# Patient Record
Sex: FEMALE | Race: WHITE | NOT HISPANIC OR LATINO | Employment: OTHER | URBAN - METROPOLITAN AREA
[De-identification: names, ages, dates, MRNs, and addresses within clinical notes are randomized per-mention and may not be internally consistent; named-entity substitution may affect disease eponyms.]

---

## 2020-01-01 ENCOUNTER — DOCUMENTATION ONLY (OUTPATIENT)
Dept: INFUSION THERAPY | Facility: HOSPITAL | Age: 50
End: 2020-01-01

## 2020-01-01 ENCOUNTER — TELEPHONE (OUTPATIENT)
Dept: HEMATOLOGY/ONCOLOGY | Facility: CLINIC | Age: 50
End: 2020-01-01

## 2020-01-01 ENCOUNTER — DOCUMENTATION ONLY (OUTPATIENT)
Dept: HEMATOLOGY/ONCOLOGY | Facility: CLINIC | Age: 50
End: 2020-01-01

## 2020-01-01 ENCOUNTER — DOCUMENTATION ONLY (OUTPATIENT)
Dept: PHARMACY | Facility: AMBULARY SURGERY CENTER | Age: 50
End: 2020-01-01

## 2020-01-01 ENCOUNTER — TELEPHONE (OUTPATIENT)
Dept: NEUROSURGERY | Facility: CLINIC | Age: 50
End: 2020-01-01

## 2020-01-01 ENCOUNTER — CLINICAL SUPPORT (OUTPATIENT)
Dept: HEMATOLOGY/ONCOLOGY | Facility: CLINIC | Age: 50
End: 2020-01-01
Payer: MEDICARE

## 2020-01-01 ENCOUNTER — LAB VISIT (OUTPATIENT)
Dept: FAMILY MEDICINE | Facility: CLINIC | Age: 50
End: 2020-01-01
Payer: MEDICARE

## 2020-01-01 ENCOUNTER — INFUSION (OUTPATIENT)
Dept: INFUSION THERAPY | Facility: HOSPITAL | Age: 50
End: 2020-01-01
Attending: INTERNAL MEDICINE
Payer: MEDICARE

## 2020-01-01 ENCOUNTER — LAB VISIT (OUTPATIENT)
Dept: LAB | Facility: HOSPITAL | Age: 50
End: 2020-01-01
Attending: INTERNAL MEDICINE
Payer: MEDICARE

## 2020-01-01 ENCOUNTER — OFFICE VISIT (OUTPATIENT)
Dept: HEMATOLOGY/ONCOLOGY | Facility: CLINIC | Age: 50
End: 2020-01-01
Payer: MEDICARE

## 2020-01-01 ENCOUNTER — HOSPITAL ENCOUNTER (OUTPATIENT)
Dept: RADIOLOGY | Facility: HOSPITAL | Age: 50
Discharge: HOME | End: 2020-07-22
Attending: NEUROLOGICAL SURGERY
Payer: MEDICARE

## 2020-01-01 ENCOUNTER — CLINICAL SUPPORT (OUTPATIENT)
Dept: NEUROSURGERY | Facility: CLINIC | Age: 50
End: 2020-01-01
Payer: MEDICARE

## 2020-01-01 VITALS
RESPIRATION RATE: 14 BRPM | DIASTOLIC BLOOD PRESSURE: 76 MMHG | TEMPERATURE: 99 F | BODY MASS INDEX: 21.43 KG/M2 | HEIGHT: 65 IN | SYSTOLIC BLOOD PRESSURE: 145 MMHG | OXYGEN SATURATION: 94 % | WEIGHT: 128.63 LBS | HEART RATE: 102 BPM

## 2020-01-01 VITALS
TEMPERATURE: 98 F | OXYGEN SATURATION: 91 % | RESPIRATION RATE: 20 BRPM | HEART RATE: 112 BPM | BODY MASS INDEX: 22.06 KG/M2 | SYSTOLIC BLOOD PRESSURE: 126 MMHG | WEIGHT: 128.5 LBS | DIASTOLIC BLOOD PRESSURE: 74 MMHG

## 2020-01-01 VITALS
OXYGEN SATURATION: 96 % | DIASTOLIC BLOOD PRESSURE: 59 MMHG | BODY MASS INDEX: 21.91 KG/M2 | RESPIRATION RATE: 11 BRPM | HEIGHT: 64 IN | WEIGHT: 128.31 LBS | SYSTOLIC BLOOD PRESSURE: 124 MMHG | TEMPERATURE: 98 F | HEART RATE: 104 BPM

## 2020-01-01 VITALS
HEIGHT: 64 IN | SYSTOLIC BLOOD PRESSURE: 124 MMHG | HEART RATE: 108 BPM | RESPIRATION RATE: 20 BRPM | BODY MASS INDEX: 21.94 KG/M2 | DIASTOLIC BLOOD PRESSURE: 58 MMHG | WEIGHT: 128.5 LBS | OXYGEN SATURATION: 94 % | TEMPERATURE: 98 F

## 2020-01-01 VITALS
RESPIRATION RATE: 18 BRPM | HEIGHT: 64 IN | DIASTOLIC BLOOD PRESSURE: 66 MMHG | BODY MASS INDEX: 21.94 KG/M2 | SYSTOLIC BLOOD PRESSURE: 119 MMHG | HEART RATE: 84 BPM | TEMPERATURE: 99 F | WEIGHT: 128.5 LBS

## 2020-01-01 DIAGNOSIS — C79.31 BRAIN METASTASES: ICD-10-CM

## 2020-01-01 DIAGNOSIS — C79.51 BONE METASTASES: ICD-10-CM

## 2020-01-01 DIAGNOSIS — T40.2X5A CONSTIPATION DUE TO OPIOID THERAPY: ICD-10-CM

## 2020-01-01 DIAGNOSIS — C34.90 PRIMARY MALIGNANT NEOPLASM OF LUNG METASTATIC TO OTHER SITE, UNSPECIFIED LATERALITY: Primary | ICD-10-CM

## 2020-01-01 DIAGNOSIS — G89.21 CHRONIC PAIN DUE TO TRAUMA: ICD-10-CM

## 2020-01-01 DIAGNOSIS — G89.3 PAIN, NEOPLASM-RELATED: ICD-10-CM

## 2020-01-01 DIAGNOSIS — C78.00 SECONDARY CARCINOMA OF LUNG, UNSPECIFIED LATERALITY: Primary | ICD-10-CM

## 2020-01-01 DIAGNOSIS — Z03.818 ENCNTR FOR OBS FOR SUSP EXPSR TO OTH BIOLG AGENTS RULED OUT: Primary | ICD-10-CM

## 2020-01-01 DIAGNOSIS — F41.1 GENERALIZED ANXIETY DISORDER: ICD-10-CM

## 2020-01-01 DIAGNOSIS — C79.31 METASTATIC SMALL CELL CARCINOMA TO BRAIN: Primary | ICD-10-CM

## 2020-01-01 DIAGNOSIS — K59.03 CONSTIPATION DUE TO OPIOID THERAPY: ICD-10-CM

## 2020-01-01 DIAGNOSIS — I82.403 DEEP VEIN THROMBOSIS (DVT) OF BOTH LOWER EXTREMITIES, UNSPECIFIED CHRONICITY, UNSPECIFIED VEIN: Primary | ICD-10-CM

## 2020-01-01 DIAGNOSIS — C78.00 SECONDARY CARCINOMA OF LUNG, UNSPECIFIED LATERALITY: ICD-10-CM

## 2020-01-01 DIAGNOSIS — Z03.818 ENCNTR FOR OBS FOR SUSP EXPSR TO OTH BIOLG AGENTS RULED OUT: ICD-10-CM

## 2020-01-01 DIAGNOSIS — T82.9XXA VASCULAR PORT COMPLICATION, INITIAL ENCOUNTER: Primary | ICD-10-CM

## 2020-01-01 DIAGNOSIS — C34.90 PRIMARY MALIGNANT NEOPLASM OF LUNG METASTATIC TO OTHER SITE, UNSPECIFIED LATERALITY: ICD-10-CM

## 2020-01-01 DIAGNOSIS — S72.001D CLOSED FRACTURE OF RIGHT HIP WITH ROUTINE HEALING, SUBSEQUENT ENCOUNTER: ICD-10-CM

## 2020-01-01 DIAGNOSIS — C79.31 METASTATIC SMALL CELL CARCINOMA TO BRAIN: ICD-10-CM

## 2020-01-01 DIAGNOSIS — I62.9 INTRACRANIAL HEMORRHAGE: ICD-10-CM

## 2020-01-01 DIAGNOSIS — C34.90 NON-SMALL CELL LUNG CANCER WITH METASTASIS: ICD-10-CM

## 2020-01-01 DIAGNOSIS — R60.0 LOCALIZED EDEMA: ICD-10-CM

## 2020-01-01 LAB
ALBUMIN SERPL BCP-MCNC: 3.5 G/DL (ref 3.5–5.2)
ALP SERPL-CCNC: 101 U/L (ref 38–145)
ALT SERPL W/O P-5'-P-CCNC: 21 U/L (ref 0–35)
ANION GAP SERPL CALC-SCNC: 7 MMOL/L (ref 8–16)
AST SERPL-CCNC: 23 U/L (ref 14–36)
BASOPHILS # BLD AUTO: 0.02 K/UL (ref 0–0.2)
BASOPHILS NFR BLD: 0.2 % (ref 0–1.9)
BILIRUB SERPL-MCNC: 0.3 MG/DL (ref 0.2–1.3)
BUN SERPL-MCNC: 12 MG/DL (ref 7–18)
CALCIUM SERPL-MCNC: 9.4 MG/DL (ref 8.4–10.2)
CHLORIDE SERPL-SCNC: 96 MMOL/L (ref 95–110)
CO2 SERPL-SCNC: 30 MMOL/L (ref 22–31)
CREAT SERPL-MCNC: 0.38 MG/DL (ref 0.5–1.4)
DIFFERENTIAL METHOD: ABNORMAL
EOSINOPHIL # BLD AUTO: 0 K/UL (ref 0–0.5)
EOSINOPHIL NFR BLD: 0.2 % (ref 0–8)
ERYTHROCYTE [DISTWIDTH] IN BLOOD BY AUTOMATED COUNT: 14.4 % (ref 11.5–14.5)
EST. GFR  (AFRICAN AMERICAN): >60 ML/MIN/1.73 M^2
EST. GFR  (NON AFRICAN AMERICAN): >60 ML/MIN/1.73 M^2
GLUCOSE SERPL-MCNC: 235 MG/DL (ref 70–110)
HCT VFR BLD AUTO: 38.5 % (ref 37–48.5)
HGB BLD-MCNC: 12.3 G/DL (ref 12–16)
IMM GRANULOCYTES # BLD AUTO: 0.08 K/UL (ref 0–0.04)
IMM GRANULOCYTES NFR BLD AUTO: 0.6 % (ref 0–0.5)
LYMPHOCYTES # BLD AUTO: 0.6 K/UL (ref 1–4.8)
LYMPHOCYTES NFR BLD: 5 % (ref 18–48)
MAGNESIUM SERPL-MCNC: 1.7 MG/DL (ref 1.6–2.6)
MCH RBC QN AUTO: 30.1 PG (ref 27–31)
MCHC RBC AUTO-ENTMCNC: 31.9 G/DL (ref 32–36)
MCV RBC AUTO: 94 FL (ref 82–98)
MONOCYTES # BLD AUTO: 0.4 K/UL (ref 0.3–1)
MONOCYTES NFR BLD: 2.9 % (ref 4–15)
NEUTROPHILS # BLD AUTO: 11.7 K/UL (ref 1.8–7.7)
NEUTROPHILS NFR BLD: 91.1 % (ref 38–73)
NRBC BLD-RTO: 0 /100 WBC
PLATELET # BLD AUTO: 79 K/UL (ref 150–350)
PMV BLD AUTO: 9.9 FL (ref 9.2–12.9)
POTASSIUM SERPL-SCNC: 3.9 MMOL/L (ref 3.5–5.1)
PROT SERPL-MCNC: 6.1 G/DL (ref 6–8.4)
RBC # BLD AUTO: 4.09 M/UL (ref 4–5.4)
SARS-COV-2 RNA RESP QL NAA+PROBE: NOT DETECTED
SODIUM SERPL-SCNC: 133 MMOL/L (ref 136–145)
WBC # BLD AUTO: 12.84 K/UL (ref 3.9–12.7)

## 2020-01-01 PROCEDURE — 99214 PR OFFICE/OUTPT VISIT, EST, LEVL IV, 30-39 MIN: ICD-10-PCS | Mod: S$PBB,,, | Performed by: NURSE PRACTITIONER

## 2020-01-01 PROCEDURE — 96413 CHEMO IV INFUSION 1 HR: CPT | Mod: PN

## 2020-01-01 PROCEDURE — 63600175 PHARM REV CODE 636 W HCPCS: Mod: PN | Performed by: INTERNAL MEDICINE

## 2020-01-01 PROCEDURE — 99999 PR PBB SHADOW E&M-EST. PATIENT-LVL IV: CPT | Mod: PBBFAC,,, | Performed by: NURSE PRACTITIONER

## 2020-01-01 PROCEDURE — 99214 OFFICE O/P EST MOD 30 MIN: CPT | Mod: PBBFAC,PN | Performed by: NURSE PRACTITIONER

## 2020-01-01 PROCEDURE — 99999 PR PBB SHADOW E&M-EST. PATIENT-LVL IV: ICD-10-PCS | Mod: PBBFAC,,, | Performed by: NURSE PRACTITIONER

## 2020-01-01 PROCEDURE — 99213 OFFICE O/P EST LOW 20 MIN: CPT | Mod: PBBFAC,25,PN | Performed by: INTERNAL MEDICINE

## 2020-01-01 PROCEDURE — 99215 OFFICE O/P EST HI 40 MIN: CPT | Mod: S$PBB,,, | Performed by: INTERNAL MEDICINE

## 2020-01-01 PROCEDURE — 80053 COMPREHEN METABOLIC PANEL: CPT | Mod: PN

## 2020-01-01 PROCEDURE — 96415 CHEMO IV INFUSION ADDL HR: CPT | Mod: PN

## 2020-01-01 PROCEDURE — 85025 COMPLETE CBC W/AUTO DIFF WBC: CPT | Mod: PN

## 2020-01-01 PROCEDURE — 99999 PR PBB SHADOW E&M-EST. PATIENT-LVL IV: ICD-10-PCS | Mod: PBBFAC,,, | Performed by: INTERNAL MEDICINE

## 2020-01-01 PROCEDURE — 96367 TX/PROPH/DG ADDL SEQ IV INF: CPT | Mod: PN

## 2020-01-01 PROCEDURE — 99214 OFFICE O/P EST MOD 30 MIN: CPT | Mod: PBBFAC,PN | Performed by: INTERNAL MEDICINE

## 2020-01-01 PROCEDURE — 83735 ASSAY OF MAGNESIUM: CPT

## 2020-01-01 PROCEDURE — 80053 COMPREHEN METABOLIC PANEL: CPT

## 2020-01-01 PROCEDURE — 99999 PR PBB SHADOW E&M-EST. PATIENT-LVL III: CPT | Mod: PBBFAC,,, | Performed by: INTERNAL MEDICINE

## 2020-01-01 PROCEDURE — 85025 COMPLETE CBC W/AUTO DIFF WBC: CPT

## 2020-01-01 PROCEDURE — 70450 CT HEAD/BRAIN W/O DYE: CPT | Mod: 26,,, | Performed by: RADIOLOGY

## 2020-01-01 PROCEDURE — 25000003 PHARM REV CODE 250: Mod: PN | Performed by: INTERNAL MEDICINE

## 2020-01-01 PROCEDURE — 99214 OFFICE O/P EST MOD 30 MIN: CPT | Mod: S$PBB,,, | Performed by: NURSE PRACTITIONER

## 2020-01-01 PROCEDURE — 96417 CHEMO IV INFUS EACH ADDL SEQ: CPT | Mod: PN

## 2020-01-01 PROCEDURE — 99215 PR OFFICE/OUTPT VISIT, EST, LEVL V, 40-54 MIN: ICD-10-PCS | Mod: S$PBB,,, | Performed by: INTERNAL MEDICINE

## 2020-01-01 PROCEDURE — 70450 CT HEAD/BRAIN W/O DYE: CPT | Mod: TC,PO

## 2020-01-01 PROCEDURE — A4216 STERILE WATER/SALINE, 10 ML: HCPCS | Mod: PN | Performed by: INTERNAL MEDICINE

## 2020-01-01 PROCEDURE — 36593 DECLOT VASCULAR DEVICE: CPT | Mod: PN

## 2020-01-01 PROCEDURE — 83735 ASSAY OF MAGNESIUM: CPT | Mod: PN

## 2020-01-01 PROCEDURE — U0003 INFECTIOUS AGENT DETECTION BY NUCLEIC ACID (DNA OR RNA); SEVERE ACUTE RESPIRATORY SYNDROME CORONAVIRUS 2 (SARS-COV-2) (CORONAVIRUS DISEASE [COVID-19]), AMPLIFIED PROBE TECHNIQUE, MAKING USE OF HIGH THROUGHPUT TECHNOLOGIES AS DESCRIBED BY CMS-2020-01-R: HCPCS

## 2020-01-01 PROCEDURE — 70450 CT HEAD WITHOUT CONTRAST: ICD-10-PCS | Mod: 26,,, | Performed by: RADIOLOGY

## 2020-01-01 PROCEDURE — 99999 PR PBB SHADOW E&M-EST. PATIENT-LVL IV: CPT | Mod: PBBFAC,,, | Performed by: INTERNAL MEDICINE

## 2020-01-01 PROCEDURE — 36415 COLL VENOUS BLD VENIPUNCTURE: CPT | Mod: PN

## 2020-01-01 PROCEDURE — 63600175 PHARM REV CODE 636 W HCPCS: Mod: JG,PN | Performed by: INTERNAL MEDICINE

## 2020-01-01 PROCEDURE — 96375 TX/PRO/DX INJ NEW DRUG ADDON: CPT | Mod: PN

## 2020-01-01 PROCEDURE — 99999 PR PBB SHADOW E&M-EST. PATIENT-LVL III: ICD-10-PCS | Mod: PBBFAC,,, | Performed by: INTERNAL MEDICINE

## 2020-01-01 PROCEDURE — S0028 INJECTION, FAMOTIDINE, 20 MG: HCPCS | Mod: PN | Performed by: INTERNAL MEDICINE

## 2020-01-01 RX ORDER — SODIUM CHLORIDE 0.9 % (FLUSH) 0.9 %
10 SYRINGE (ML) INJECTION
Status: DISCONTINUED | OUTPATIENT
Start: 2020-01-01 | End: 2020-01-01 | Stop reason: HOSPADM

## 2020-01-01 RX ORDER — ONDANSETRON 8 MG/1
8 TABLET, ORALLY DISINTEGRATING ORAL EVERY 12 HOURS PRN
Qty: 30 TABLET | Refills: 1 | Status: ON HOLD | OUTPATIENT
Start: 2020-01-01 | End: 2020-01-01 | Stop reason: HOSPADM

## 2020-01-01 RX ORDER — HEPARIN 100 UNIT/ML
500 SYRINGE INTRAVENOUS
Status: CANCELLED | OUTPATIENT
Start: 2020-01-01

## 2020-01-01 RX ORDER — DIPHENHYDRAMINE HYDROCHLORIDE 50 MG/ML
50 INJECTION INTRAMUSCULAR; INTRAVENOUS ONCE AS NEEDED
Status: DISCONTINUED | OUTPATIENT
Start: 2020-01-01 | End: 2020-01-01 | Stop reason: HOSPADM

## 2020-01-01 RX ORDER — PROCHLORPERAZINE MALEATE 10 MG
10 TABLET ORAL EVERY 6 HOURS PRN
Qty: 30 TABLET | Refills: 1 | Status: ON HOLD | OUTPATIENT
Start: 2020-01-01 | End: 2020-01-01 | Stop reason: HOSPADM

## 2020-01-01 RX ORDER — FAMOTIDINE 10 MG/ML
20 INJECTION INTRAVENOUS
Status: CANCELLED | OUTPATIENT
Start: 2020-01-01

## 2020-01-01 RX ORDER — EPINEPHRINE 0.3 MG/.3ML
0.3 INJECTION SUBCUTANEOUS ONCE AS NEEDED
Status: CANCELLED | OUTPATIENT
Start: 2020-01-01

## 2020-01-01 RX ORDER — FAMOTIDINE 10 MG/ML
20 INJECTION INTRAVENOUS
Status: COMPLETED | OUTPATIENT
Start: 2020-01-01 | End: 2020-01-01

## 2020-01-01 RX ORDER — DIPHENHYDRAMINE HYDROCHLORIDE 50 MG/ML
50 INJECTION INTRAMUSCULAR; INTRAVENOUS ONCE AS NEEDED
Status: CANCELLED | OUTPATIENT
Start: 2020-01-01

## 2020-01-01 RX ORDER — SODIUM CHLORIDE 0.9 % (FLUSH) 0.9 %
10 SYRINGE (ML) INJECTION
Status: CANCELLED | OUTPATIENT
Start: 2020-01-01

## 2020-01-01 RX ORDER — EPINEPHRINE 0.3 MG/.3ML
0.3 INJECTION SUBCUTANEOUS ONCE AS NEEDED
Status: DISCONTINUED | OUTPATIENT
Start: 2020-01-01 | End: 2020-01-01 | Stop reason: HOSPADM

## 2020-01-01 RX ORDER — HEPARIN 100 UNIT/ML
500 SYRINGE INTRAVENOUS
Status: DISCONTINUED | OUTPATIENT
Start: 2020-01-01 | End: 2020-01-01 | Stop reason: HOSPADM

## 2020-01-01 RX ADMIN — Medication 10 ML: at 04:07

## 2020-01-01 RX ADMIN — SODIUM CHLORIDE: 9 INJECTION, SOLUTION INTRAVENOUS at 09:07

## 2020-01-01 RX ADMIN — DIPHENHYDRAMINE HYDROCHLORIDE 50 MG: 50 INJECTION, SOLUTION INTRAMUSCULAR; INTRAVENOUS at 10:07

## 2020-01-01 RX ADMIN — BEVACIZUMAB 800 MG: 400 INJECTION, SOLUTION INTRAVENOUS at 02:07

## 2020-01-01 RX ADMIN — DEXAMETHASONE SODIUM PHOSPHATE: 4 INJECTION, SOLUTION INTRA-ARTICULAR; INTRALESIONAL; INTRAMUSCULAR; INTRAVENOUS; SOFT TISSUE at 09:07

## 2020-01-01 RX ADMIN — CARBOPLATIN 900 MG: 10 INJECTION, SOLUTION INTRAVENOUS at 02:07

## 2020-01-01 RX ADMIN — PACLITAXEL 330 MG: 6 INJECTION, SOLUTION INTRAVENOUS at 10:07

## 2020-01-01 RX ADMIN — APREPITANT 130 MG: 130 INJECTION, EMULSION INTRAVENOUS at 09:07

## 2020-01-01 RX ADMIN — FAMOTIDINE 20 MG: 10 INJECTION, SOLUTION INTRAVENOUS at 09:07

## 2020-01-01 RX ADMIN — ALTEPLASE 2 MG: 2.2 INJECTION, POWDER, LYOPHILIZED, FOR SOLUTION INTRAVENOUS at 10:07

## 2020-06-10 PROBLEM — C79.31 BRAIN METASTASES: Status: ACTIVE | Noted: 2020-01-01

## 2020-06-11 PROBLEM — G93.9 LESION OF BRAIN: Status: ACTIVE | Noted: 2020-01-01

## 2020-06-12 PROBLEM — R42 DIZZINESS: Status: ACTIVE | Noted: 2020-01-01

## 2020-06-12 NOTE — TELEPHONE ENCOUNTER
----- Message from July Olea NP sent at 6/12/2020 12:18 PM CDT -----  She needs a wound check POD#10 (DOS 6/12/20). She needs 4 week post-op visit with CT head wo prior to appt. Thanks.

## 2020-06-15 PROBLEM — R53.81 DEBILITY: Status: ACTIVE | Noted: 2020-01-01

## 2020-06-19 NOTE — TELEPHONE ENCOUNTER
----- Message from Will Lee LPN sent at 6/18/2020  5:47 PM CDT -----  Contact: Chilango blakely    ----- Message -----  From: Cinda Montilla  Sent: 6/18/2020   7:50 AM CDT  To: [facility] Staff    Type: Needs Medical Advice  Who Called:  Chilango  Symptoms (please be specific):  pt just had surgery and can't make the appt  Best Call Back Number: [phone]  Additional Information: Pls call pt to reschedule todays appt

## 2020-06-19 NOTE — TELEPHONE ENCOUNTER
Spoke with pt's  to get pt r/s from missed appt yesterday. Pt was r/s to 6/23/20 @ 0920 with Dr Tillman. Pt's  verbalized understanding.

## 2020-06-22 NOTE — PROGRESS NOTES
Pt is 10 days s/p craniotomy with Dr. Archibald. No s/s of infection. Incision cleaned with chloraprep and sutures removed with no issue. Incision is warm, dry, intact. Pt reports post-operative pain level < pre-operative state. Pt is not requesting medication refill today. Pt re-educated on narcotics policy. Educated patient on weight lifting status, bending/lifting/twisting, and to call with any changes or questions. Pt aware of imaging and f/u appt with provider. No further questions.

## 2020-06-23 NOTE — TELEPHONE ENCOUNTER
----- Message from Barb Green sent at 6/23/2020  8:24 AM CDT -----  Regarding: appointment  Contact: pt's  Dory  Patient 's  DORY called and asked for a call back the patient does not feel well she   Fell last night and they are asking to reschedule her appt.  Please call back   Call back [phone]

## 2020-06-23 NOTE — TELEPHONE ENCOUNTER
Spoke with Pt's . Mr Ivania notified that pt was fine from her fall and did not hit her head or hurt herself. Pt needed to be r/s to another date. Pt was r/s to 7/14/20 with Dr Tillman for a NP/Hospital f/u @ 9155. Pt will have a CT scan of her head and see Dr Baires, Neuro and also a PET scan with Dr Junior from [facility] prior to her appt with Dr Tillman. Pt's  verbalized understanding.

## 2020-07-05 PROBLEM — R52 UNCONTROLLED PAIN: Status: ACTIVE | Noted: 2020-01-01

## 2020-07-05 PROBLEM — C34.90 NON-SMALL CELL LUNG CANCER WITH METASTASIS: Status: ACTIVE | Noted: 2020-01-01

## 2020-07-06 NOTE — TELEPHONE ENCOUNTER
This is the patient I just talked to you about that is currently admitted - please talk to Dr. Baires and let me know if I should have her do the 4 week CT scan inpatient and cancel her outpatient follow up. Thanks!!

## 2020-07-09 NOTE — PROGRESS NOTES
Rec'd message from pt's sister Gilda to contact pt's , Dory. Dory states that he is concerned about pt's needs in the home once she is discharged. Encouraged Dory to work with hospital discharge nurse and/or  to communicate needs. Dory states that he would like to make change in home health provider. SW provided information on contacting nurse  to request this change. Provided guidance on what services might be provided by home health and what may need to be provided by a sitter, family member or other caregiver.     Discussed Dory's concerns about his wife's understanding of her illness and prognosis. Discussed coping strategies.Acknowledged challenges, provided supportive care, made plans to meet at [facility] for further social work support. Kari Watson LCSW,OSW-C

## 2020-07-10 NOTE — TELEPHONE ENCOUNTER
Spoke with . Stated she has a lot of appts next week, wanted to come the following week. Appts scheduled. Date, time and locations confirmed.

## 2020-07-14 NOTE — PROGRESS NOTES
"Subjective:       Patient ID: Emilia Redd is a 49 y.o. female.    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  Patient has missed prior appointments with me  Has presented to emergency room and to palliative care for pain management seems to be in a lot of pain complains of left leg swelling.  Left arm was swelling as well but an ultrasound was done confirm no thrombus.  She is in a wheelchair with her  and sister in attend both perform I have met in the past  She has history of irritable bowel/Crohn's for which she uses feeding tubes eats well according to patient  She has been chronically ill and mostly bedbound and this has not changed much.  Performance status is 1+  Patient wearing eye patch over right eye post surgery she also has what appears to be Bell's Palsy with lid lag.    Oncology History:  Metastatic adenocarcinoma to brain, tissue found to be originating from lung and pelvic wall and pelvic nodes  Status post craniotomy resection currently undergoing radiation therapy  -3. LEFT FRONTAL BRAIN TUMOR LATERAL AND ANTERIOR BIOPSIES AND    RESECTION:   - METASTATIC NON-SMALL CELL PULMONARY CARCINOMA - ADENOCARCINOMA.    EGFR mutation Exon 18/19/20/ 21 not detected   alk not detected   Ross negative   PDL1 low expression at 41%  Past Medical History:   Diagnosis Date    a Family H/O CAD     "SEs q5yr At Age 50"    b Hypertension     11/18/15 RXd A Low Salt Diet; She Stopped Her Losartan On her Own    c Hypertriglyceridemia With Low HDL     3/8/16 She Was Not Fasting So Ordered Repeat Labs In 6 Weeks    d Prediabetes With Family H/O DM     3/21/16 RXd Lifestyle Changes And Metformin 500 Mg Bid    g Chronic NCNC Anemia     i 1 PPD X 20 YR TUD     "Stop Smoking"    i Metastatic " Non-Small Cell Pulmonary Adenocarcinoma #############    Dr. Sharri Razo; With Brain Metastases; [facility] 6/10/20-6/16/20 Stay For This    j Crohn'  Dx S/P 3 Bowel Resections     Dr. Demetrius Paige On 12/16/16 Is Scheduling A TC; Dr. Warren Hill    j GERD     Dr. Demetrius Paige; She Takes Protonix PRN    j H/O Colon Polyps     Dr. Demetrius Paige On 12/16/16 Is Scheduling A TC    k Cervical Intraepithelial Neoplasia     k Frequent urination     Dr. Ty Ricks At [facility]    l Bilateral Knee AVN     Dr. Nick monae Chronic Pain Syndrome     Dr. Nick monae H/O Bilateral THRs For AVN     From Steroids For Crohns; Dr. Nick monae H/O Positive Rheumatoid Factor     Dr. Staci Ramirez In [place]; But 11/11/15 RF = 8 (<14) And LAURENCE = Negative     n Chronic insomnia     n Generalized anxiety disorder     On Lexapro 5 Mg    o Acute Otitis Media 6/12/17     o Migraines     q Family H/O Malignant Melanoma     11/2015 Referred To Dr. Malorie Lozoya    q Pustular psoriasis     Dr. Staci Ramirez In [place]; 11/11/15 RF And LAURENCE = Negative    q Suspected Black Mold Exposure 08/2016     8/15/16 Referred To Dr. Mendel Pagan (ALL/IMM)     Past Surgical History:   Procedure Laterality Date    ABDOMINAL SURGERY      COLECTOMY      CRANIOTOMY FOR EXCISION OF INTRACRANIAL TUMOR Left 6/12/2020    Procedure: CRANIOTOMY, FOR INTRACRANIAL NEOPLASM EXCISION Left frontal craniotomy for resection of lesions using STEALTH navigation;  Surgeon: Ricardo Baires MD;  Location: [facility];  Service: Neurosurgery;  Laterality: Left;    HIP SURGERY      PORTACATH PLACEMENT  1992     Family History   Problem Relation Age of Onset    Diabetes Mother     Melanoma Mother     Diabetes Father     Heart disease Father     Hypertension Father     Hyperlipidemia Father     Migraines Sister       Social History     Socioeconomic History    Marital status:      Spouse name: Not on file     Number of children: Not on file    Years of education: Not on file    Highest education level: Not on file   Occupational History    Not on file   Social Needs    Financial resource strain: Not on file    Food insecurity     Worry: Not on file     Inability: Not on file    Transportation needs     Medical: Not on file     Non-medical: Not on file   Tobacco Use    Smoking status: Current Every Day Smoker     Packs/day: 1.00     Years: 20.00     Pack years: 20.00    Smokeless tobacco: Never Used   Substance and Sexual Activity    Alcohol use: No    Drug use: No    Sexual activity: Not on file   Lifestyle    Physical activity     Days per week: Not on file     Minutes per session: Not on file    Stress: Not on file   Relationships    Social connections     Talks on phone: Not on file     Gets together: Not on file     Attends Sabianism service: Not on file     Active member of club or organization: Not on file     Attends meetings of clubs or organizations: Not on file     Relationship status: Not on file   Other Topics Concern    Not on file   Social History Narrative    Not on file     Review of patient's allergies indicates:   Allergen Reactions    Nsaids (non-steroidal anti-inflammatory drug)     Sulfa (sulfonamide antibiotics)        Current Outpatient Medications:     acetaminophen (TYLENOL) 500 MG tablet, Take 2 tablets (1,000 mg total) by mouth every 8 (eight) hours as needed for Pain., Disp: , Rfl: 0    bisacodyL (DULCOLAX) 10 mg Supp, Place 1 suppository (10 mg total) rectally daily as needed (constipation)., Disp: 30 suppository, Rfl: 0    cefdinir (OMNICEF) 300 MG capsule, Take 300 mg by mouth every 12 (twelve) hours., Disp: , Rfl:     HYDROmorphone (DILAUDID) 8 MG tablet, Take 1 tablet (8 mg total) by mouth every 3 (three) hours as needed for Pain (breakthrough pain)., Disp: 240 tablet, Rfl: 0    L.acidophil,parac-S.therm-Bif. (RISAQUAD) Cap capsule, Take 1 capsule by mouth once  daily., Disp:  , Rfl:     lactulose (CEPHULAC) 20 gram Pack, Take 1 packet (20 g total) by mouth once daily. for 30 doses, Disp: 30 packet, Rfl: 1    lorazepam (ATIVAN) 1 MG tablet, Take 1 mg by mouth 3 (three) times daily., Disp: , Rfl:     melatonin (MELATIN) 3 mg tablet, Take 2 tablets (6 mg total) by mouth nightly as needed for Insomnia. (Patient taking differently: Take 10 mg by mouth nightly as needed for Insomnia.), Disp:  , Rfl: 0    nystatin (MYCOSTATIN) 100,000 unit/mL suspension, Take 4 mLs by mouth 3 (three) times daily as needed (throat pain). swish and swallow, Disp: , Rfl:     ondansetron (ZOFRAN) 8 MG tablet, Take 1 tablet (8 mg total) by mouth every 8 (eight) hours as needed for Nausea., Disp: 30 tablet, Rfl: 0    oxyCODONE (OXYCONTIN) 60 mg TR12 12 hr tablet, Take 1 tablet (60 mg total) by mouth 3 (three) times daily., Disp: 90 tablet, Rfl: 0    pantoprazole (PROTONIX) 40 MG tablet, Take 1 tablet (40 mg total) by mouth once daily., Disp: 30 tablet, Rfl: 3    polyethylene glycol (GLYCOLAX) 17 gram PwPk, Take 17 g by mouth once daily., Disp: 30 each, Rfl: 3    predniSONE (DELTASONE) 10 MG tablet, Take 20 mg by mouth once daily. decrease by 10mg/day every 3 to 4 days until finished, Disp: , Rfl:     senna-docusate 8.6-50 mg (PERICOLACE) 8.6-50 mg per tablet, Take 1 tablet by mouth once daily., Disp: 30 tablet, Rfl: 1    Physical Exam    Wheelchair-bound patient chronically ill-appearing  Lungs are clear to auscultation without rales rhonchi or crepitation  Trachea central, no lymphadenopathy, no JVD  Neck is supple  CVS S1-S2 normal no murmurs gallops    With Bell's palsy  Wearing eye patch status post cranial resection.  Patient is weak getting weaker in her lower extremity left leg much more swollen than right weight-bearing has been difficult.  Eating  Has port  Denies fever, chills, rigors has a lot of pain for which Dilaudid and OxyContin  given by palliative care she is pending this  prescription feeling at pharmacy  Lab Results   Component Value Date    WBC 10.65 07/09/2020    HGB 11.2 (L) 07/09/2020    HCT 34.4 (L) 07/09/2020    MCV 94 07/09/2020     07/09/2020       BMP  Lab Results   Component Value Date     (L) 07/09/2020    K 3.6 07/09/2020    CL 98 07/09/2020    CO2 28 07/09/2020    BUN 5 (L) 07/09/2020    CREATININE 0.33 (L) 07/09/2020    CALCIUM 9.0 07/09/2020    ANIONGAP 5 (L) 07/09/2020    ESTGFRAFRICA >60 07/09/2020    EGFRNONAA >60 07/09/2020               Objective:         Assessment:       1. Deep vein thrombosis (DVT) of both lower extremities, unspecified chronicity, unspecified vein    2. Localized edema       metastatic adenocarcinoma originating from lung  Plan:         Patient Active Problem List   Diagnosis    Hypertension    Prediabetes With Family H/O DM    Hypertriglyceridemia With Low HDL    GERD    Positive Rheumatoid Factor    H/O Bilateral THRs For AVN    Bilateral Knee AVN    Chronic NCNC Anemia    H/O Colon Polyps    Migraines    Generalized anxiety disorder    Chronic insomnia    1 PPD X 20 YR TUD    Pustular psoriasis    Family H/O Malignant Melanoma    Frequent urination    Family H/O CAD    Cervical Intraepithelial Neoplasia    Chronic Pain Syndrome    Suspected Black Mold Exposure 08/2016    Acute Otitis Media 6/12/17    Metastatic small cell carcinoma to brain    Lesion of brain    Dizziness    Debility    Palliative care by specialist    Brain metastases    Uncontrolled pain    Non-small cell lung cancer with metastasis    Abdominal pain    Metastatic Non-Small Cell Pulmonary Adenocarcinoma    Constipation due to opioid therapy     Metastatic adenocarcinoma of lung:  Currently completing radiation therapy  Has  Chemo class  Proceed with chemotherapy orders ASAP antiemetics sent to pharmacy start date ASAP  ; left leg swelling obtained ultrasound to rule out DVT  Nourishment  support discussed with patient   and sister     chemotherapy plan discussed with pt.The discussion included and was not limited to Pros and cons,risks and benefits, out come data, side effect profile. Social issues, cosmetic issues were also discussed. Pt voices understanding. Pt has been asked to call with questions if any.        Above 55 min spent with patient in discussion and plan

## 2020-07-14 NOTE — Clinical Note
Patient to start chemotherapy ASAP  Please arrange chemotherapy class  Start date. patient has port  Orders are in  Please get UA see me for start date

## 2020-07-14 NOTE — PLAN OF CARE
START ON PATHWAY REGIMEN - Non-Small Cell Lung    [id number]        Paclitaxel (Taxol)       Carboplatin (Paraplatin)       Bevacizumab-xxxx           Additional Orders: * All AUC calculations intended to be used in [place]   formula    **Always confirm dose/schedule in your pharmacy ordering system**    Patient Characteristics:  Stage IV Metastatic, Nonsquamous, Initial Chemotherapy/Immunotherapy, PS = 0, 1,   ALK or EGFR or ROS1 or NTRK Genomic Alterations - Awaiting Test Results  AJCC T Category: T4  Current Disease Status: Distant Metastases  AJCC N Category: N1  AJCC M Category: M1c  AJCC 8 Stage Grouping: IVB  Histology: Nonsquamous Cell  ROS1 Rearrangement Status: Negative  T790M Mutation Status: Not Applicable - EGFR Mutation Negative/Unknown  Other Mutations/Biomarkers: No Other Actionable Mutations  NTRK Gene Fusion Status: Awaiting Test Results  PD-L1 Expression Status: PD-L1 Positive 1-49% (TPS)  Chemotherapy/Immunotherapy LOT: Initial Chemotherapy/Immunotherapy  Molecular Targeted Therapy: Not Appropriate  ALK Rearrangement Status: Negative  EGFR Mutation Status: Non-Sensitizing  BRAF V600E Mutation Status: Awaiting Test Results  ECOG Performance Status: 0  Intent of Therapy:  Non-Curative / Palliative Intent, Not Discussed with Patient

## 2020-07-16 NOTE — TELEPHONE ENCOUNTER
----- Message from Anh Hogan MA sent at 7/16/2020  2:55 PM CDT -----  Type: Needs Medical Advice  Who Called:  Emilia Don Call Back Number:   Additional Information: patient is calling about the pre-chemo class.  She states it is for tomorrow and she would like to verify that is she and her family are scheduled to attend.  She originally stated that it would be she and her  and sister.  She would like to change that to her , herself, her son, and her sister.  Please call patient to confirm

## 2020-07-16 NOTE — TELEPHONE ENCOUNTER
Spoke with JOSIAS Kee.  Explained to him that only 1 visitor is allowed due to COVID-19.  He voiced understanding.  Patient was scheduled for 7/23 @ .  Chilango voiced understanding of date, time and location.

## 2020-07-20 NOTE — PROGRESS NOTES
[8:36 AM] SHERMAN Westbrook      [facility] [phone] - She will have class on Thursday and need to start as soon as she can after  ?[8:36 AM] SHERMAN Westbrook      could she start Monday or Tuesday.  She will need a COVID    ?[9:06 AM] Deb Tay      I can do 9:30 on tuesday 07/28/20 [facility] [phone]    ?[9:06 AM] SHERMAN Westbrook      thanks

## 2020-07-23 NOTE — PROGRESS NOTES
Pharmacy Treatment Plan Review    Patient  Emilia Redd, 49 y.o.  1970    Indication: NSCLC     History:  -Stage IV metastitic  -s/p craniotomy resection  -Patient started xrt to brain and pelvis for mets     Labs:  CBC  Lab Results   Component Value Date    WBC 10.65 07/09/2020    HGB 11.2 (L) 07/09/2020    HCT 34.4 (L) 07/09/2020    MCV 94 07/09/2020     07/09/2020       BMP  Lab Results   Component Value Date     (L) 07/09/2020    K 3.6 07/09/2020    CL 98 07/09/2020    CO2 28 07/09/2020    BUN 5 (L) 07/09/2020    CREATININE 0.33 (L) 07/09/2020    CALCIUM 9.0 07/09/2020    ANIONGAP 5 (L) 07/09/2020    ESTGFRAFRICA >60 07/09/2020    EGFRNONAA >60 07/09/2020       LFTs  Lab Results   Component Value Date    ALT 12 07/09/2020    AST 19 07/09/2020    ALKPHOS 72 07/09/2020    BILITOT 0.4 07/09/2020       The patient is scheduled to start the following infusion:   OP NSCLC PACLITAXEL + CARBOPLATIN (AUC) + BEVACIZUMAB Q3W    21-day cycle for 6 cycles of:    Chemotherapy:   PACLitaxeL (TAXOL) 200 mg/m2 in sodium chloride 0.9% 500 mL chemo infusion, Intravenous,on day 1    CARBOplatin (PARAPLATIN) AUC of 6 in sodium chloride 0.9% 250 mL chemo infusion, Intravenous, on day 1    bevacizumab (AVASTIN) 15 mg/kg in sodium chloride 0.9% 100 mL chemo infusion, Intravenous, Clinic/HOD 1 time, 0 of 6 cycles    Premeds  -Aprepitant 130 mg IVP  -Palonosetron 0.25 mg/Dexamethasone 20 mg     Supportive meds  -inquiring from provider office OBI neulasta can be added to plan    The treatment plan matches NCCN template

## 2020-07-23 NOTE — PROGRESS NOTES
"Subjective:       Patient ID: Emilia Redd is a 49 y.o. female.    Chief Complaint: Patient Education    HPI This is a 49 year old white female known to Dr. Tillman for a diagnosis of Metastatic adenocarcinoma of the lung involving brain and bone. She presents to the clinic today with her  for chemotherapy education.    Oncology History   Metastatic Non-Small Cell Pulmonary Adenocarcinoma   7/6/2020 Initial Diagnosis    Metastatic Non-Small Cell Pulmonary Adenocarcinoma     7/14/2020 -  Chemotherapy    Treatment Summary   Plan Name: OP NSCLC PACLITAXEL + CARBOPLATIN (AUC) + BEVACIZUMAB Q3W  Treatment Goal: Palliative  Status: Active  Start Date: 7/14/2020 (Planned)  End Date: 10/27/2020 (Planned)  Provider: Sharri Tillman MD  Chemotherapy: bevacizumab (AVASTIN) 15 mg/kg = 875 mg in sodium chloride 0.9% 100 mL chemo infusion, 15 mg/kg = 875 mg, Intravenous, Clinic/HOD 1 time, 0 of 6 cycles  CARBOplatin (PARAPLATIN) 900 mg in sodium chloride 0.9% 250 mL chemo infusion, 900 mg (100 % of original dose 900 mg), Intravenous, Clinic/HOD 1 time, 0 of 6 cycles  Dose modification:   (original dose 900 mg, Cycle 1)  PACLitaxeL (TAXOL) 200 mg/m2 = 330 mg in sodium chloride 0.9% 500 mL chemo infusion, 200 mg/m2 = 330 mg, Intravenous, Clinic/HOD 1 time, 0 of 6 cycles       Past Medical History:   Diagnosis Date    a Family H/O CAD     "SEs q5yr At Age 50"    b Hypertension     11/18/15 RXd A Low Salt Diet; She Stopped Her Losartan On her Own    c Hypertriglyceridemia With Low HDL     3/8/16 She Was Not Fasting So Ordered Repeat Labs In 6 Weeks    d Prediabetes With Family H/O DM     3/21/16 RXd Lifestyle Changes And Metformin 500 Mg Bid    g Chronic NCNC Anemia     i 1 PPD X 20 YR TUD     "Stop Smoking"    i Metastatic Non-Small Cell Pulmonary Adenocarcinoma #############    Dr. Sharri Razo; With Brain Metastases; [facility] 6/10/20-6/16/20 Stay For This    j Crohn'  Dx S/P 3 Bowel Resections     Dr. Romo " "[place] On 12/16/16 Is Scheduling A TC; Dr. Warren Hill    j GERD     Dr. Demetrius Paige; She Takes Protonix PRN    j H/O Colon Polyps     Dr. Demetrius Paige On 12/16/16 Is Scheduling A TC    k Cervical Intraepithelial Neoplasia     k Frequent urination     [facility]icks [facility]    l Bilateral Knee AVN     Dr. Nick monae Chronic Pain Syndrome     Dr. Nick monae H/O Bilateral THRs For AVN     From Steroids For Crohns; Dr. Nick monae H/O Positive Rheumatoid Factor     Dr. Staci Ramirez In [place]; But 11/11/15 RF = 8 (<14) And LAURENCE = Negative     n Chronic insomnia     n Generalized anxiety disorder     On Lexapro 5 Mg    o Acute Otitis Media 6/12/17     o Migraines     q Family H/O Malignant Melanoma     11/2015 Referred To Dr. Malorie Lozoya    q Pustular psoriasis     Dr. Staci Ramirez In [place]; 11/11/15 RF And LAURENCE = Negative    q Suspected Black Mold Exposure 08/2016     8/15/16 Referred To Dr. Mendel Pagan (ALL/IMM)       Review of Systems   Constitutional: Positive for activity change and fatigue.   Eyes:        Eye patch to right eye   Gastrointestinal: Positive for constipation.   Genitourinary: Positive for frequency.   Musculoskeletal: Positive for back pain.   Neurological: Positive for weakness.   All other systems reviewed and are negative.        Objective:       Vitals:    07/23/20 1410   BP: (!) 124/59   BP Location: Right arm   Patient Position: Sitting   Pulse: 104   Resp: 11   Temp: 98.3 °F (36.8 °C)   TempSrc: Temporal   SpO2: 96%   Weight: 58.2 kg (128 lb 4.9 oz)   Height: 5' 4" (1.626 m)       Physical Exam  Vitals signs reviewed.   Constitutional:       Appearance: She is normal weight.   HENT:      Head: Normocephalic.   Cardiovascular:      Rate and Rhythm: Normal rate and regular rhythm.      Pulses: Normal pulses.      Heart sounds: Normal heart sounds.   Pulmonary:      Effort: Pulmonary effort is normal.      Breath sounds: " Normal breath sounds.      Comments: Clubbing to fingers  Abdominal:      General: Bowel sounds are normal.      Palpations: Abdomen is soft.   Skin:     General: Skin is warm and dry.      Capillary Refill: Capillary refill takes 2 to 3 seconds.   Neurological:      Mental Status: She is alert and oriented to person, place, and time.   Psychiatric:         Behavior: Behavior normal.         Thought Content: Thought content normal.         Assessment:       Metastatic adenocarcinoma of lung to brain and bone.    Plan:       1.  Treatment plan of Taxol/Carboplatin/Avastin every 3 weeks x 6 cycles discussed with patient and .  2.  Handouts provided from [employer] on chemotherapy agents.    3.  Discussed the mechanism of action, potential side effects of this treatment as well as ways to manage them at home. Some of these side effects include but not limited to fever, nausea, vomiting, decreased appetite, fatigue, weakness, cytopenias, myalgia/arthralgia, constipation, diarrhea, bleeding, headache, nail changes, taste change, hair thinning/loss, peripheral neuropathy, kidney toxicity, or ototoxicity.   4.  Dietary modifications discussed.  Detail instructions to be provided by dietician.   5.  Chemotherapy Binder supplied with contact information.   6.  Discussed follow-up with the physician for toxicity monitoring throughout treatment.    7.  Scripts were escribed by physician prior.  Explained for home use.      8.  The patient and  verbalized understanding. All questions were answered and an informed consent was obtained.     Assessment/plan reviewed and approved by Dr. Tillman.

## 2020-07-28 PROBLEM — S72.009A HIP FRACTURE: Status: ACTIVE | Noted: 2020-01-01

## 2020-07-28 NOTE — PLAN OF CARE
"Pt came for her 1st chemo treatment.  Port in left side of chest accessed and no blood return noted.  Flushed with some resistance.  Pt states the port is 30 years old and it wasn't used in the last 4 years except for when she was recently hospitalized.  Pt also states her surgeon tried to "pull the port out during an office visit" but it was too painful so he "put it back in".  Since then it has not worked properly and has not had a blood return.  Cathflo instilled for 45 minutes and still no blood flow and unable to withdraw Cathflow.  Dr. Tillman's office notified and order received to hold treatment until a port study can be done.  Study arranged for 3 PM today and chemo rescheduled for tomorrow morning at 9:30.  Port left accessed for use during port study.  Ortez tubing labeled that Cathflo is still in port.  Pt left via w/c with .    "

## 2020-07-28 NOTE — PROGRESS NOTES
Called and spoke with Will, port not giving blood return, attempted cath flow x45 minutes (out of potential 2 hours) with no success, calling bc pt is a long treatment, has had port in place for 30 years, not used in 4 years except when hospitalized recently where they also did not get blood return.  When flushing there is resistance met although possible to flush.  She has poor venous access for treatment today.  Per Dr. Tillman pt to be coordinated for port study.  Will r/s treatment for tomorrow pending results of study.  When discussing she stated that when she saw MD about removing port a couple years back they attempted to remove in office, pulled at site, unable to remove due to scar tissue, and pt had significant pain.  She is agreeable to be getting port evaluated.

## 2020-07-28 NOTE — PROGRESS NOTES
Nutrition: Met with pt today and reviewed food safety, poor appetite, n/v, and fatigue handouts. Higgins ab pts history with Chron's and needing to monitor her blood sugar due to medication. Encouraged pt to contact her chron's doctor to get suggestions on food items he would like her to eat as well. Will continue to monior and follow throughout treatment.     Alexsandra Frye MS, RD, LDN

## 2020-07-29 NOTE — TELEPHONE ENCOUNTER
Patient was contacted by infusion.  Treatment will be today.    ----- Message from Billie Rodas sent at 7/29/2020  8:05 AM CDT -----  Contact: sister  Type: Needs Medical Advice  Who Called:  Gilda Weldon - sister    Best Call Back Number:[phone]  Additional Information: contact to advise if patient is to still have chemo today, and if her current port can still be used.

## 2020-07-29 NOTE — PLAN OF CARE
Problem: Adult Inpatient Plan of Care  Goal: Plan of Care Review  Outcome: Ongoing, Progressing  Flowsheets (Taken 7/29/2020 7112)  Plan of Care Reviewed With: patient     Pt tolerated tx well this shift. VSS throughout treatment. Pt escorted to vehicle via wheelchair. Calendar given and reviewed with pt. Pt is safe for discharge.

## 2020-07-29 NOTE — PROGRESS NOTES
"Subjective:       Patient ID: Emilia Redd is a 49 y.o. female.    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  Patient has missed prior appointments with me  Has presented to emergency room and to palliative care for pain management seems to be in a lot of pain complains of left leg swelling.  Left arm was swelling as well but an ultrasound was done confirm no thrombus.  She is in a wheelchair with her  and sister in attend both perform I have met in the past  She has history of irritable bowel/Crohn's for which she uses feeding tubes eats well according to patient  She has been chronically ill and mostly bedbound and this has not changed much.  Performance status is 1+  Patient wearing eye patch over right eye post surgery she also has what appears to be Bell's Palsy with lid lag.    Oncology History:  Metastatic adenocarcinoma to brain, tissue found to be originating from lung and pelvic wall and pelvic nodes  Status post craniotomy resection currently undergoing radiation therapy  -3. LEFT FRONTAL BRAIN TUMOR LATERAL AND ANTERIOR BIOPSIES AND    RESECTION:   - METASTATIC NON-SMALL CELL PULMONARY CARCINOMA - ADENOCARCINOMA.    EGFR mutation Exon 18/19/20/ 21 not detected   alk not detected   Ross negative   PDL1 low expression at 41%   she is anxious about rx today, usg of leg was negative for DVT   pt reassured  Reports 2-3 weeks ago hip fracture  Past Medical History:   Diagnosis Date    a Family H/O CAD     "SEs q5yr At Age 50"    b Hypertension     11/18/15 RXd A Low Salt Diet; She Stopped Her Losartan On her Own    c Hypertriglyceridemia With Low HDL     3/8/16 She Was Not Fasting So Ordered Repeat Labs In 6 Weeks    d Prediabetes With Family H/O DM     3/21/16 RXd Lifestyle Changes And " "Metformin 500 Mg Bid    g Chronic NCNC Anemia     i 1 PPD X 20 YR TUD     "Stop Smoking"    i Metastatic Non-Small Cell Pulmonary Adenocarcinoma #############    Dr. Sharri Razo; With Brain Metastases; [facility] 6/10/20-6/16/20 Stay For This    j Crohn'  Dx S/P 3 Bowel Resections     Dr. Demetrius Paige On 12/16/16 Is Scheduling A TC; Dr. Warren Hill    j GERD     Dr. Demetrius Paige; She Takes Protonix PRN    j H/O Colon Polyps     Dr. Demetrius Paige On 12/16/16 Is Scheduling A TC    k Cervical Intraepithelial Neoplasia     k Frequent urination     [facility]    l Bilateral Knee AVN     Dr. Nick monae Chronic Pain Syndrome     Dr. Nick monae H/O Bilateral THRs For AVN     From Steroids For Crohns; Dr. Nick monae H/O Positive Rheumatoid Factor     Dr. Staci Ramirez [place]; But 11/11/15 RF = 8 (<14) And LAURENCE = Negative     n Chronic insomnia     n Generalized anxiety disorder     On Lexapro 5 Mg    o Acute Otitis Media 6/12/17     o Migraines     q Family H/O Malignant Melanoma     11/2015 Referred To Dr. Malorie Lozoya    q Pustular psoriasis     Dr. Staci Ramirez [place]; 11/11/15 RF And LAURENCE = Negative    q Suspected Black Mold Exposure 08/2016     8/15/16 Referred To Dr. Mendel Pagan (ALL/IMM)     Past Surgical History:   Procedure Laterality Date    ABDOMINAL SURGERY      COLECTOMY      CRANIOTOMY FOR EXCISION OF INTRACRANIAL TUMOR Left 6/12/2020    Procedure: CRANIOTOMY, FOR INTRACRANIAL NEOPLASM EXCISION Left frontal craniotomy for resection of lesions using STEALTH navigation;  Surgeon: Ricardo Baires MD;  [facility];  Service: Neurosurgery;  Laterality: Left;    HIP SURGERY      PORTACATH PLACEMENT  1992     Family History   Problem Relation Age of Onset    Diabetes Mother     Melanoma Mother     Diabetes Father     Heart disease Father     Hypertension Father     Hyperlipidemia Father     Migraines " Sister       Social History     Socioeconomic History    Marital status:      Spouse name: Not on file    Number of children: Not on file    Years of education: Not on file    Highest education level: Not on file   Occupational History    Not on file   Social Needs    Financial resource strain: Not on file    Food insecurity     Worry: Not on file     Inability: Not on file    Transportation needs     Medical: Not on file     Non-medical: Not on file   Tobacco Use    Smoking status: Current Every Day Smoker     Packs/day: 1.00     Years: 20.00     Pack years: 20.00    Smokeless tobacco: Never Used   Substance and Sexual Activity    Alcohol use: No    Drug use: No    Sexual activity: Not on file   Lifestyle    Physical activity     Days per week: Not on file     Minutes per session: Not on file    Stress: Not on file   Relationships    Social connections     Talks on phone: Not on file     Gets together: Not on file     Attends [employer]ism service: Not on file     Active member of club or organization: Not on file     Attends meetings of clubs or organizations: Not on file     Relationship status: Not on file   Other Topics Concern    Not on file   Social History Narrative    Not on file     Review of patient's allergies indicates:   Allergen Reactions    Nsaids (non-steroidal anti-inflammatory drug)     Sulfa (sulfonamide antibiotics)        Current Outpatient Medications:     acetaminophen (TYLENOL) 500 MG tablet, Take 2 tablets (1,000 mg total) by mouth every 8 (eight) hours as needed for Pain., Disp: , Rfl: 0    bisacodyL (DULCOLAX) 10 mg Supp, Place 1 suppository (10 mg total) rectally daily as needed (constipation)., Disp: 30 suppository, Rfl: 0    cefdinir (OMNICEF) 300 MG capsule, Take 300 mg by mouth every 12 (twelve) hours., Disp: , Rfl:     dicyclomine (BENTYL) 10 mg/5 mL syrup, Take 10 mg by mouth 3 (three) times daily as needed (cramping)., Disp: , Rfl:     HYDROmorphone  "(DILAUDID) 8 MG tablet, Take 1 tablet (8 mg total) by mouth every 3 (three) hours as needed for Pain (breakthrough pain)., Disp: 240 tablet, Rfl: 0    insulin aspart U-100 (NOVOLOG FLEXPEN U-100 INSULIN) 100 unit/mL (3 mL) InPn pen, With meals and bedtime sliding scale. Sliding scale provided. (Patient taking differently: Inject 2 Units into the skin 3 (three) times daily with meals. EJ=027-624=4 units        201-250=4 units        251-300=6 units        301-350=8 units        >350=10 units and call MD or ANDRAE), Disp: 15 mL, Rfl: 3    L.acidophil,parac-S.therm-Bif. (RISAQUAD) Cap capsule, Take 1 capsule by mouth once daily., Disp:  , Rfl:     lactulose (CEPHULAC) 20 gram Pack, Take 1 packet (20 g total) by mouth once daily. for 30 doses, Disp: 30 packet, Rfl: 1    lorazepam (ATIVAN) 1 MG tablet, Take 1 mg by mouth 3 (three) times daily., Disp: , Rfl:     melatonin (MELATIN) 3 mg tablet, Take 2 tablets (6 mg total) by mouth nightly as needed for Insomnia. (Patient taking differently: Take 10 mg by mouth nightly as needed for Insomnia.), Disp:  , Rfl: 0    nystatin (MYCOSTATIN) 100,000 unit/mL suspension, Take 4 mLs by mouth 3 (three) times daily as needed (throat pain). swish and swallow, Disp: , Rfl:     ondansetron (ZOFRAN) 8 MG tablet, Take 1 tablet (8 mg total) by mouth every 8 (eight) hours as needed for Nausea., Disp: 30 tablet, Rfl: 0    ondansetron (ZOFRAN-ODT) 8 MG TbDL, Take 1 tablet (8 mg total) by mouth every 12 (twelve) hours as needed. (Patient taking differently: Take 1 tablet by mouth every 12 (twelve) hours as needed (nausea).), Disp: 30 tablet, Rfl: 1    oxyCODONE (OXYCONTIN) 60 mg TR12 12 hr tablet, Take 1 tablet (60 mg total) by mouth 3 (three) times daily., Disp: 90 tablet, Rfl: 0    pantoprazole (PROTONIX) 40 MG tablet, Take 1 tablet (40 mg total) by mouth once daily., Disp: 30 tablet, Rfl: 3    pen needle, diabetic 32 gauge x 5/32" Ndle, 1 each by Misc.(Non-Drug; Combo Route) route 3 " (three) times daily before meals., Disp: 90 each, Rfl: 11    polyethylene glycol (GLYCOLAX) 17 gram PwPk, Take 17 g by mouth once daily., Disp: 30 each, Rfl: 3    predniSONE (DELTASONE) 10 MG tablet, Take 20 mg by mouth once daily. Pt is continuing to take 20 mg , Disp: , Rfl:     prochlorperazine (COMPAZINE) 10 MG tablet, Take 1 tablet (10 mg total) by mouth every 6 (six) hours as needed., Disp: 30 tablet, Rfl: 1    senna-docusate 8.6-50 mg (PERICOLACE) 8.6-50 mg per tablet, Take 1 tablet by mouth once daily., Disp: 30 tablet, Rfl: 1  No current facility-administered medications for this visit.     Physical Exam    Wt Readings from Last 3 Encounters:   07/28/20 58.3 kg (128 lb 8.5 oz)   07/28/20 58.3 kg (128 lb 8.5 oz)   07/24/20 58.3 kg (128 lb 9.6 oz)     Temp Readings from Last 3 Encounters:   07/28/20 98.3 °F (36.8 °C)   07/28/20 98.3 °F (36.8 °C) (Temporal)   07/27/20 96 °F (35.6 °C) (Temporal)     BP Readings from Last 3 Encounters:   07/28/20 (!) 124/58   07/28/20 126/74   07/27/20 (!) 140/84     Pulse Readings from Last 3 Encounters:   07/28/20 108   07/28/20 (!) 112   07/27/20 103     Wheelchair-bound patient chronically ill-appearing recent ( 2.5 weeks ago hip fracture)  Lungs are clear to auscultation without rales rhonchi or crepitation  Trachea central, no lymphadenopathy, no JVD  Neck is supple  CVS S1-S2 normal no murmurs gallops    With Bell's palsy  Wearing eye patch status post cranial resection.  Patient is weak getting weaker in her lower extremity left leg much more swollen than right weight-bearing has been difficult.  Eating  Has port  Denies fever, chills, rigors has a lot of pain for which Dilaudid and OxyContin  given by palliative care she is pending this prescription feeling at pharmacy  Lab Results   Component Value Date    WBC 12.84 (H) 07/23/2020    HGB 12.3 07/23/2020    HCT 38.5 07/23/2020    MCV 94 07/23/2020    PLT 79 (L) 07/23/2020       BMP  Lab Results   Component Value  Date     (L) 07/23/2020    K 3.9 07/23/2020    CL 96 07/23/2020    CO2 30 07/23/2020    BUN 12 07/23/2020    CREATININE 0.38 (L) 07/23/2020    CALCIUM 9.4 07/23/2020    ANIONGAP 7 (L) 07/23/2020    ESTGFRAFRICA >60 07/23/2020    EGFRNONAA >60 07/23/2020       Ct abd  7/2020  Impression:      1. Ill-defined patchy ground-glass opacities and reticulonodular opacities within the visualized right middle and right lower lobes new from prior exam concerning for pneumonia.  2. Redemonstration of left left pericolic gutter mass arising from/abutting the descending colon enlarged in the interval.  Left abdominal wall mass enlarged in the interval.  New/progressing left pleural base soft tissue nodules versus retrocrural lymph nodes.  There is right iliac bone lesion.  Enlarging left pelvic lymph nodes.  New left hepatic lobe lesion.  3. Additional findings as above.  4. [place] concordance.          Assessment:       1. Primary malignant neoplasm of lung metastatic to other site, unspecified laterality      metastatic adenocarcinoma originating from lung  Plan:         Patient Active Problem List   Diagnosis    Hypertension    Prediabetes With Family H/O DM    Hypertriglyceridemia With Low HDL    GERD    Positive Rheumatoid Factor    H/O Bilateral THRs For AVN    Bilateral Knee AVN    Chronic NCNC Anemia    H/O Colon Polyps    Migraines    Generalized anxiety disorder    Chronic insomnia    1 PPD X 20 YR TUD    Pustular psoriasis    Family H/O Malignant Melanoma    Frequent urination    Family H/O CAD    Cervical Intraepithelial Neoplasia    Chronic Pain Syndrome    Suspected Black Mold Exposure 08/2016    Acute Otitis Media 6/12/17    Metastatic small cell carcinoma to brain    Lesion of brain    Dizziness    Debility    Palliative care by specialist    Brain metastases    Uncontrolled pain    Non-small cell lung cancer with metastasis    Abdominal pain    Metastatic Non-Small Cell  Pulmonary Adenocarcinoma    Constipation due to opioid therapy   Met. adenoca of lung . Proceed with treatment today completed radiation therapy to hip area  Had Chemo class   pt has a hip fracture which we have decided together to to delay rx duew to need to embark on systemic therapy sooner [place] later   antiemetics written and pt reassured   rtc in time for next cycle with cbc, cmp     left leg swelling obtained ultrasound and ruled out DVT  Nourishment  support discussed with patient  and sister     Advance Care planning/ directives /living will/patient's wishes discussed with patient.  Patient has been given guidelines and instructions on completing these directives  COVID social distancing, face mask use, hand washing techniques and personal hygiene routine discussed with patient  Good exercise, nutrition and weight management discussed with patient  Health maintenance activities and follow-up with PCPs recommendations discussed with patient

## 2020-08-03 PROBLEM — E87.1 ACUTE HYPONATREMIA: Status: ACTIVE | Noted: 2020-01-01

## 2020-08-03 NOTE — TELEPHONE ENCOUNTER
Janet with [facility] called with questions about pt. She was there with the pt who had her chemo tx and wasn't feeling well. Transferred call to [place] [facility] to speak with pt/Janet.

## 2020-08-03 NOTE — PROGRESS NOTES
Confirmed by  nurse, Yojana, patient has a heart rate of 123, eating and drinking very little, incontinent of bowel, fell last night, and very groggy/sleepy and lethargic at times.  Advised by Merritt Gonsalez NP to advise for patient to present to ER for further evaluation.  Yojana verbalized understanding.

## 2020-08-04 PROBLEM — E87.6 HYPOKALEMIA: Status: ACTIVE | Noted: 2020-01-01

## 2020-08-04 NOTE — PROGRESS NOTES
Rec'd call from pt's sister, Gilda. Gilda reports that Dory (pt's ) told her that they are considering hospice care. I answered questions about hospice care, provided suggestions about what questions to ask when selecting a hospice service. Explained what they may expect with regard to transition to hospice care, availability of DME, management of pain, availability of support, etc. I also allowed for the expression of painful feelings and  provided supportive care. Encouraged Gilda to contact me again if she needs anything more. Gilda states that both she and Dory may be reaching out for additional support. Kari Watson,MERLYW,OSW-C

## 2020-08-06 PROBLEM — I95.9 HYPOTENSION: Status: ACTIVE | Noted: 2020-01-01
